# Patient Record
Sex: MALE | NOT HISPANIC OR LATINO | Employment: FULL TIME | URBAN - METROPOLITAN AREA
[De-identification: names, ages, dates, MRNs, and addresses within clinical notes are randomized per-mention and may not be internally consistent; named-entity substitution may affect disease eponyms.]

---

## 2020-08-20 ENCOUNTER — TRANSCRIBE ORDERS (OUTPATIENT)
Dept: ADMINISTRATIVE | Facility: HOSPITAL | Age: 45
End: 2020-08-20

## 2020-08-20 DIAGNOSIS — M50.00 CERVICAL DISC DISORDER WITH MYELOPATHY: Primary | ICD-10-CM

## 2020-09-08 ENCOUNTER — HOSPITAL ENCOUNTER (OUTPATIENT)
Dept: MRI IMAGING | Facility: HOSPITAL | Age: 45
Discharge: HOME | End: 2020-09-08

## 2020-10-08 ENCOUNTER — OFFICE VISIT (OUTPATIENT)
Dept: NEUROSURGERY | Facility: CLINIC | Age: 45
End: 2020-10-08

## 2020-10-08 VITALS — TEMPERATURE: 97.3 F | HEIGHT: 70 IN | BODY MASS INDEX: 30.95 KG/M2 | WEIGHT: 216.2 LBS | RESPIRATION RATE: 15 BRPM

## 2020-10-08 DIAGNOSIS — G89.29 CHRONIC NECK PAIN: Primary | ICD-10-CM

## 2020-10-08 DIAGNOSIS — M54.12 CERVICAL RADICULOPATHY: ICD-10-CM

## 2020-10-08 DIAGNOSIS — M54.2 CHRONIC NECK PAIN: Primary | ICD-10-CM

## 2020-10-08 PROCEDURE — 99204 OFFICE O/P NEW MOD 45 MIN: CPT | Performed by: PHYSICIAN ASSISTANT

## 2020-10-08 NOTE — PROGRESS NOTES
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  Patient states that over the last few weeks he has noted worsening neck pain that he has already at baseline along with some numbness and tingling in his right arm that is intermittent and is not precipitated by any activity.  Rest and stress management tends to help the numbness and currently today he is feeling much better after a trial of physical therapy and stretches.    Patient presents today with x-rays from Dr. Bustos.    Since the injury about 6-8 weeks ago patient has improved and currently is having no pain but intermittent numbness in his right arm.  Patient has x-rays that show fairly normal cervical spine other than the 6 7 area that has anterior and posterior disc osteophyte oblique views do show a little bit of foraminal stenosis at C6-7 on the right.  This could contribute to some of his issues.    Patient wished to see us prior to getting an MRI for recommendation.  Patient is currently doing very well and without signs and symptoms of nerve compromise or myelopathy and I think any surgical intervention would be warranted, however with his chronic neck pain and the new numbness in his right upper extremity I think it is reasonable to get an MRI.    Review of Systems   Constitutional: Negative for activity change, appetite change, chills, diaphoresis, fatigue, fever and unexpected weight change.   HENT: Negative for congestion, dental problem, drooling, ear discharge, ear pain, facial swelling, hearing loss, mouth sores, nosebleeds, postnasal drip, rhinorrhea, sinus pressure, sneezing, sore throat, tinnitus, trouble swallowing and voice change.    Eyes: Negative for photophobia, pain, discharge, redness, itching and " visual disturbance.   Respiratory: Negative for apnea, cough, choking, chest tightness, shortness of breath, wheezing and stridor.    Cardiovascular: Negative for chest pain, palpitations and leg swelling.   Gastrointestinal: Positive for abdominal pain. Negative for abdominal distention, anal bleeding, blood in stool, constipation, diarrhea, nausea, rectal pain and vomiting.   Endocrine: Negative for cold intolerance, heat intolerance, polydipsia, polyphagia and polyuria.   Genitourinary: Negative for decreased urine volume, difficulty urinating, dysuria, enuresis, flank pain, frequency, genital sores, hematuria and urgency.   Musculoskeletal: Positive for back pain and neck pain. Negative for arthralgias, gait problem, joint swelling, myalgias and neck stiffness.   Skin: Negative for color change, pallor, rash and wound.   Allergic/Immunologic: Negative for environmental allergies, food allergies and immunocompromised state.   Neurological: Negative for dizziness, tremors, seizures, syncope, facial asymmetry, speech difficulty, weakness, light-headedness, numbness and headaches.   Hematological: Negative for adenopathy. Does not bruise/bleed easily.   Psychiatric/Behavioral: Negative for agitation, behavioral problems, confusion, decreased concentration, dysphoric mood, hallucinations, self-injury, sleep disturbance and suicidal ideas. The patient is not nervous/anxious and is not hyperactive.    All other systems reviewed and are negative.      Past Medical History:   History reviewed. No pertinent past medical history.    Family History:     Family History   Problem Relation Age of Onset   • Cancer Mother        Social History:    reports that he has never smoked. He has never used smokeless tobacco. He reports current alcohol use. He reports that he does not use drugs.   SMOKING STATUS: Non-smoker    Surgical History:   History reviewed. No pertinent surgical history.    Allergies:   Patient has no known  "allergies.    Physical Exam:    Vital Signs:Temp 97.3 °F (36.3 °C) (Infrared)   Resp 15   Ht 177.8 cm (70\")   Wt 98.1 kg (216 lb 3.2 oz)   BMI 31.02 kg/m²    BMI: Body mass index is 31.02 kg/m².     GENERAL:           The patient is in no acute distress, and is able to answer all questions appropriately.    Neck:          Supple without lymphadenopathy    Cardiovascular:       Peripheral pulses 2+ at dorsalis pedis and anterior tibialis    Lungs:         Breathing unlabored    Musculoskeletal:            strength is 5 out of 5 bilaterally.        Shoulder abduction is 5 out of 5.         Dorsiflexion is 5/5 Bilaterally       Plantarflexion is 5/5 bilaterally       Hip Flexion 5/5 bilaterally.         The patient´s gait is normal without antalgia.    Neurologic:          The patient is alert and oriented by 3.          Pupils are equal and reactive to light.         Visual fields are full.         Extraocular movements are intact without nystagmus.         There is no evidence of central motor drift. No facial droop.  No difficulty with rapid alternating movements.         Sensation is equal bilaterally with no deficit.           Reflexes:  2+ through out    CRANIAL NERVES:         Cranial nerve II: Visual fields are full to confrontation.       Cranial nerves III, IV and VI: PERRLA DC.  Extraocular movements are intact.  Nystagmus is not present.       Cranial nerve V: Facial sensation is intact to light touch.       Cranial nerve VII: Muscles of facial expression revealed no asymmetry.       Cranial nerve VIII: Hearing is intact to finger rub bilaterally.       Cranial nerve IX and X: Palate elevates symmetrically.        Cranial nerve XI: Shoulder shrug is intact.       Cranial nerve XII: Tongue is midline without evidence of Atrophy or fasciculation.        Medical Decision Making    Data Review:   X-ray of the cervical spine AP lateral flexion-extension and obliques were reviewed no abnormal motion noted " "on flexion extension.    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  Obliques show patent foramen other than the right 6 7 will looks looks like it has a bone spur causing moderate stenosis.    Diagnosis:   Cervical radiculopathy  Chronic neck pain     treatment Options:     We are going to get an MRI to check on the foraminal stenosis at C6-7.  We also want to check for any spinal cord bruising due to the repetitive injury of \"stingers\" from his football injuries.  Patient states that the numbness that he has is very similar to the stingers that he would get and he had multiple stingers in a repetitive fashion at the end of his iris year.    Patient has neck pain at baseline but has had worsening pain over the last 8 weeks.    We will see the patient back after the MRI is complete    Patient's Body mass index is 31.02 kg/m². BMI is above normal parameters. Recommendations include: educational material.     Diagnosis Plan   1. Chronic neck pain     2. Cervical radiculopathy       "

## 2022-05-20 ENCOUNTER — HOSPITAL ENCOUNTER (OUTPATIENT)
Dept: CT IMAGING | Facility: HOSPITAL | Age: 47
Discharge: HOME | End: 2022-05-20
Admitting: FAMILY MEDICINE

## 2022-05-20 ENCOUNTER — TRANSCRIBE ORDERS (OUTPATIENT)
Dept: ADMINISTRATIVE | Facility: HOSPITAL | Age: 47
End: 2022-05-20

## 2022-05-20 DIAGNOSIS — R10.9 ACUTE ABDOMINAL PAIN: ICD-10-CM

## 2022-05-20 DIAGNOSIS — R10.9 ACUTE ABDOMINAL PAIN: Primary | ICD-10-CM

## 2022-05-20 PROCEDURE — 74176 CT ABD & PELVIS W/O CONTRAST: CPT
